# Patient Record
Sex: FEMALE | Race: WHITE | NOT HISPANIC OR LATINO | URBAN - METROPOLITAN AREA
[De-identification: names, ages, dates, MRNs, and addresses within clinical notes are randomized per-mention and may not be internally consistent; named-entity substitution may affect disease eponyms.]

---

## 2019-01-29 ENCOUNTER — TELEPHONE (OUTPATIENT)
Dept: NEUROLOGY | Facility: CLINIC | Age: 34
End: 2019-01-29

## 2019-01-29 NOTE — TELEPHONE ENCOUNTER
----- Message from Lauryn Melgoza sent at 1/29/2019  2:07 PM CST -----  Contact: self  Pt is calling in regards to rescheduling her appt on 02/01. Pt would like to reschedule to 02/04. The next available appt is 02/19.      Pt would like a call back at [phone].    Thank you